# Patient Record
Sex: FEMALE | Race: WHITE | NOT HISPANIC OR LATINO | URBAN - METROPOLITAN AREA
[De-identification: names, ages, dates, MRNs, and addresses within clinical notes are randomized per-mention and may not be internally consistent; named-entity substitution may affect disease eponyms.]

---

## 2018-03-01 ENCOUNTER — APPOINTMENT (RX ONLY)
Dept: URBAN - METROPOLITAN AREA CLINIC 170 | Facility: CLINIC | Age: 73
Setting detail: DERMATOLOGY
End: 2018-03-01

## 2018-03-01 DIAGNOSIS — Z41.9 ENCOUNTER FOR PROCEDURE FOR PURPOSES OTHER THAN REMEDYING HEALTH STATE, UNSPECIFIED: ICD-10-CM

## 2018-03-01 PROCEDURE — ? PRESCRIPTION

## 2018-03-01 PROCEDURE — ? DYSPORT

## 2018-03-01 PROCEDURE — ? COSMETIC CONSULTATION: FILLERS

## 2018-03-01 RX ORDER — LIDOCAINE, PRILOCAINE 25; 25 MG/G; MG/G
CREAM TOPICAL
Qty: 1 | Refills: 3 | Status: ERX | COMMUNITY
Start: 2018-03-01

## 2018-03-01 RX ADMIN — LIDOCAINE, PRILOCAINE: 25; 25 CREAM TOPICAL at 00:00

## 2018-03-01 ASSESSMENT — LOCATION SIMPLE DESCRIPTION DERM: LOCATION SIMPLE: RIGHT CHEEK

## 2018-03-01 ASSESSMENT — LOCATION DETAILED DESCRIPTION DERM
LOCATION DETAILED: RIGHT CENTRAL MALAR CHEEK
LOCATION DETAILED: RIGHT INFERIOR CENTRAL MALAR CHEEK

## 2018-03-01 ASSESSMENT — LOCATION ZONE DERM: LOCATION ZONE: FACE

## 2018-03-01 NOTE — PROCEDURE: DYSPORT
Masseter Units: 0
Additional Area 1 Units: 98
Detail Level: Zone
Price (Use Numbers Only, No Special Characters Or $): 545
Consent: Written consent obtained. Risks include but not limited to lid/brow ptosis, bruising, swelling, diplopia, temporary effect, incomplete chemical denervation.
Post-Care Instructions: Patient instructed to not lie down for 4 hours and limit physical activity for 24 hours.
Additional Area 1 Location: Face
Expiration Date (Month Year): 4/30/18
Lot #: [id number]
Dilution (U/ 0.1cc): 10

## 2018-03-07 ENCOUNTER — APPOINTMENT (RX ONLY)
Dept: URBAN - METROPOLITAN AREA CLINIC 170 | Facility: CLINIC | Age: 73
Setting detail: DERMATOLOGY
End: 2018-03-07

## 2018-03-07 DIAGNOSIS — Z41.9 ENCOUNTER FOR PROCEDURE FOR PURPOSES OTHER THAN REMEDYING HEALTH STATE, UNSPECIFIED: ICD-10-CM

## 2018-03-07 PROCEDURE — ? FILLERS

## 2018-03-07 ASSESSMENT — LOCATION DETAILED DESCRIPTION DERM: LOCATION DETAILED: LEFT INFERIOR MEDIAL MALAR CHEEK

## 2018-03-07 ASSESSMENT — LOCATION ZONE DERM: LOCATION ZONE: FACE

## 2018-03-07 ASSESSMENT — LOCATION SIMPLE DESCRIPTION DERM: LOCATION SIMPLE: LEFT CHEEK

## 2018-03-21 ENCOUNTER — APPOINTMENT (RX ONLY)
Dept: URBAN - METROPOLITAN AREA CLINIC 170 | Facility: CLINIC | Age: 73
Setting detail: DERMATOLOGY
End: 2018-03-21

## 2018-03-21 DIAGNOSIS — Z41.9 ENCOUNTER FOR PROCEDURE FOR PURPOSES OTHER THAN REMEDYING HEALTH STATE, UNSPECIFIED: ICD-10-CM

## 2018-03-21 PROCEDURE — ? TREATMENT REGIMEN

## 2018-03-21 PROCEDURE — ? PATIENT SPECIFIC COUNSELING

## 2018-03-21 NOTE — PROCEDURE: PATIENT SPECIFIC COUNSELING
Patient is happy with results. Will add volbella to upper cutaneous lip -etchy lines. (1) Voluma- lateral cheeks
Detail Level: Zone

## 2018-04-18 ENCOUNTER — APPOINTMENT (RX ONLY)
Dept: URBAN - METROPOLITAN AREA CLINIC 170 | Facility: CLINIC | Age: 73
Setting detail: DERMATOLOGY
End: 2018-04-18

## 2018-04-18 DIAGNOSIS — Z41.9 ENCOUNTER FOR PROCEDURE FOR PURPOSES OTHER THAN REMEDYING HEALTH STATE, UNSPECIFIED: ICD-10-CM

## 2018-04-18 PROCEDURE — ? FILLERS

## 2018-04-18 ASSESSMENT — LOCATION ZONE DERM: LOCATION ZONE: FACE

## 2018-04-18 ASSESSMENT — LOCATION DETAILED DESCRIPTION DERM: LOCATION DETAILED: RIGHT INFERIOR CENTRAL MALAR CHEEK

## 2018-04-18 ASSESSMENT — LOCATION SIMPLE DESCRIPTION DERM: LOCATION SIMPLE: RIGHT CHEEK

## 2018-04-18 NOTE — PROCEDURE: FILLERS
Dorsal Hands Filler  Volume In Cc: 0
Consent: Written consent obtained. Risks include but not limited to bruising, beading, irregular texture, ulceration, infection, allergic reaction, scar formation, incomplete augmentation, temporary nature, procedural pain.
Filler: Juvederm Volbella XC
Additional Area 1 Volume In Cc: 1
Filler: Voluma
Additional Area 1 Volume In Cc: 0.5
Lot #: [id number]
Additional Area 1 Location: Face
Include Cannula Information In Note?: No
Expiration Date (Month Year): 5/15/16
Lot #: [id number]
Expiration Date (Month Year): 06/10/19
Map Statment: See Attach Map for Complete Details
Detail Level: Zone
Lot #: [id number]
Use Map Statement For Sites (Optional): Yes
Post-Care Instructions: Patient instructed to apply ice to reduce swelling.
Topical Anesthesia?: 2.5% lidocaine, 2.5% prilocaine
Price (Use Numbers Only, No Special Characters Or $): 7669
Anesthesia Volume In Cc: 0.2
Expiration Date (Month Year): 8/12/19

## 2021-02-22 RX ORDER — LIDOCAINE AND PRILOCAINE 25; 25 MG/G; MG/G
CREAM TOPICAL
Qty: 1 | Refills: 3 | Status: ERX

## 2021-03-30 ENCOUNTER — APPOINTMENT (RX ONLY)
Dept: URBAN - METROPOLITAN AREA CLINIC 173 | Facility: CLINIC | Age: 76
Setting detail: DERMATOLOGY
End: 2021-03-30

## 2021-03-30 DIAGNOSIS — Z41.9 ENCOUNTER FOR PROCEDURE FOR PURPOSES OTHER THAN REMEDYING HEALTH STATE, UNSPECIFIED: ICD-10-CM

## 2021-03-30 PROCEDURE — ? BOTOX

## 2021-03-30 PROCEDURE — ? FILLERS

## 2021-03-30 PROCEDURE — ? TREATMENT REGIMEN

## 2021-03-30 NOTE — PROCEDURE: BOTOX
Expiration Date (Month Year): 11/23
Show Masseter Units: Yes
Inferior Lateral Orbicularis Oculi Units: 18
Right Pupillary Line Units: 0
Show Right And Left Periorbital Units: No
Glabellar Complex Units: 21
Price (Use Numbers Only, No Special Characters Or $): 314
Forehead Units: 2
Consent: Written consent obtained. Risks include but not limited to lid/brow ptosis, bruising, swelling, diplopia, temporary effect, incomplete chemical denervation.
Lot #: [id number]
Detail Level: Detailed
Post-Care Instructions: Patient instructed to not lie down for 4 hours and limit physical activity for 24 hours. Patient instructed not to travel by airplane for 48 hours.
Dilution (U/0.1 Cc): 5

## 2021-03-30 NOTE — PROCEDURE: FILLERS
Include Cannula Information In Note?: No
Marionette Lines Filler  Volume In Cc: 0
Lot #: [id number]
Expiration Date (Month Year): 7/3/23
Detail Level: Detailed
Price (Use Numbers Only, No Special Characters Or $): 7791
Expiration Date (Month Year): 01/31/22
Filler: Juvederm Voluma XC
Include Cannula Size?: 27G
Map Statment: See Attach Map for Complete Details
Cheeks Filler Volume In Cc: 2
Include Cannula Length?: 1.5 inch
Additional Area 1 Location: Ear Lobes
Lot #: [id number]
Filler: RHA 2
Anesthesia Type: 1% lidocaine with epinephrine 1:100,000 buffered with 8.4% sodium bicarbonate (1:9 ratio)
Expiration Date (Month Year): 03/12/22
Anesthesia Volume In Cc: 0.5
Post-Care Instructions: Patient instructed to apply ice to reduce swelling. Patient was given Cetirizine 10mg tablet in office for swelling.
Consent: Written consent obtained. Risks include but not limited to bruising, beading, irregular texture, ulceration, infection, allergic reaction, scar formation, incomplete augmentation, temporary nature, procedural pain.
Vermilion Lips Filler Volume In Cc: 1
Additional Anesthesia Volume In Cc: 6
Lot #: [id number]

## 2023-03-07 ENCOUNTER — APPOINTMENT (RX ONLY)
Dept: URBAN - METROPOLITAN AREA CLINIC 173 | Facility: CLINIC | Age: 78
Setting detail: DERMATOLOGY
End: 2023-03-07

## 2023-03-07 DIAGNOSIS — Z41.9 ENCOUNTER FOR PROCEDURE FOR PURPOSES OTHER THAN REMEDYING HEALTH STATE, UNSPECIFIED: ICD-10-CM

## 2023-03-07 PROCEDURE — ? BOTOX

## 2023-03-07 PROCEDURE — ? FILLERS

## 2023-03-07 NOTE — PROCEDURE: BOTOX
Expiration Date (Month Year): 05/25
Show Masseter Units: Yes
Inferior Lateral Orbicularis Oculi Units: 18
Right Pupillary Line Units: 0
Show Right And Left Periorbital Units: No
Glabellar Complex Units: 21
Price (Use Numbers Only, No Special Characters Or $): 105
Forehead Units: 2
Consent: Written consent obtained. Risks include but not limited to lid/brow ptosis, bruising, swelling, diplopia, temporary effect, incomplete chemical denervation.
Lot #: [id number]
Detail Level: Detailed
Post-Care Instructions: Patient instructed to not lie down for 4 hours and limit physical activity for 24 hours. Patient instructed not to travel by airplane for 48 hours.
Dilution (U/0.1 Cc): 5

## 2023-03-07 NOTE — PROCEDURE: FILLERS
Include Cannula Information In Note?: Yes
Marionette Lines Filler  Volume In Cc: 0
Lot #: [id number]
Expiration Date (Month Year): 9/23/2025
Include Cannula Information In Note?: No
Detail Level: Detailed
Price (Use Numbers Only, No Special Characters Or $): 7232
Expiration Date (Month Year): 01/31/22
Filler: Juvederm Voluma XC
Include Cannula Size?: 27G
Map Statment: See Attach Map for Complete Details
Cheeks Filler Volume In Cc: 2
Include Cannula Length?: 1.5 inch
Additional Area 1 Location: Ear Lobes
Lot #: [id number]
Filler: RHA Redensity
Anesthesia Type: 2% lidocaine with epinephrine and a 1:10 solution of 8.4% sodium bicarbonate
Expiration Date (Month Year): 12/12/2023
Anesthesia Volume In Cc: 0.5
Post-Care Instructions: Patient instructed to apply ice to reduce swelling. Patient was given Cetirizine 10mg tablet in office for swelling.
Consent: Written consent obtained. Risks include but not limited to bruising, beading, irregular texture, ulceration, infection, allergic reaction, scar formation, incomplete augmentation, temporary nature, procedural pain.
Vermilion Lips Filler Volume In Cc: 1
Additional Anesthesia Volume In Cc: 6
Lot #: [id number]
Topical Anesthesia?: 23% lidocaine, 7% tetracaine
Aspiration Statement: Aspiration was performed prior to injecting site with filler.

## 2024-02-19 ENCOUNTER — APPOINTMENT (RX ONLY)
Dept: URBAN - METROPOLITAN AREA CLINIC 173 | Facility: CLINIC | Age: 79
Setting detail: DERMATOLOGY
End: 2024-02-19

## 2024-02-19 DIAGNOSIS — Z41.9 ENCOUNTER FOR PROCEDURE FOR PURPOSES OTHER THAN REMEDYING HEALTH STATE, UNSPECIFIED: ICD-10-CM

## 2024-02-19 PROCEDURE — ? BOTOX

## 2024-02-19 PROCEDURE — ? FILLERS

## 2024-02-19 NOTE — PROCEDURE: BOTOX
Expiration Date (Month Year): 04/2026
Show Masseter Units: Yes
Right Pupillary Line Units: 0
Show Right And Left Periorbital Units: No
Additional Area 1 Units: 5
Price (Use Numbers Only, No Special Characters Or $): 80
Consent: Written consent obtained. Risks include but not limited to lid/brow ptosis, bruising, swelling, diplopia, temporary effect, incomplete chemical denervation.
Lot #: [id number]
Detail Level: Detailed
Post-Care Instructions: Patient instructed to not lie down for 4 hours and limit physical activity for 24 hours. Patient instructed not to travel by airplane for 48 hours.
Additional Area 1 Location: Chin

## 2024-03-13 ENCOUNTER — APPOINTMENT (RX ONLY)
Dept: URBAN - METROPOLITAN AREA CLINIC 173 | Facility: CLINIC | Age: 79
Setting detail: DERMATOLOGY
End: 2024-03-13

## 2024-03-13 DIAGNOSIS — Z41.9 ENCOUNTER FOR PROCEDURE FOR PURPOSES OTHER THAN REMEDYING HEALTH STATE, UNSPECIFIED: ICD-10-CM

## 2024-03-13 PROCEDURE — ? BOTOX

## 2024-03-13 PROCEDURE — ? COSMETIC FOLLOW-UP

## 2024-03-13 NOTE — PROCEDURE: BOTOX
Expiration Date (Month Year): 05/2026
Show Masseter Units: Yes
Right Pupillary Line Units: 0
Show Right And Left Periorbital Units: No
Glabellar Complex Units: 21
Price (Use Numbers Only, No Special Characters Or $): 739
Forehead Units: 2
Consent: Written consent obtained. Risks include but not limited to lid/brow ptosis, bruising, swelling, diplopia, temporary effect, incomplete chemical denervation.
Lot #: [id number]
Detail Level: Detailed
Post-Care Instructions: Patient instructed to not lie down for 4 hours and limit physical activity for 24 hours. Patient instructed not to travel by airplane for 48 hours.
Dilution (U/0.1 Cc): 5
Periorbital Skin Units: 18

## 2024-03-13 NOTE — PROCEDURE: COSMETIC FOLLOW-UP
Comments (Free Text): FIFU: \\n\\nPatient happy with filler treatment but wants more in the lip lines. Will plan to do a 1/2 syringe of RHA Redensity in the lip lines at next filler visit.
Detail Level: Zone
Price (Use Numbers Only, No Special Characters Or $): 0

## 2024-04-08 ENCOUNTER — APPOINTMENT (RX ONLY)
Dept: URBAN - METROPOLITAN AREA CLINIC 173 | Facility: CLINIC | Age: 79
Setting detail: DERMATOLOGY
End: 2024-04-08

## 2024-04-08 DIAGNOSIS — Z41.9 ENCOUNTER FOR PROCEDURE FOR PURPOSES OTHER THAN REMEDYING HEALTH STATE, UNSPECIFIED: ICD-10-CM

## 2024-04-08 PROCEDURE — ? FILLERS

## 2024-04-08 NOTE — PROCEDURE: FILLERS
Jawline Filler Volume In Cc: 0
Use Map Statement For Sites (Optional): No
Lot #: [id number]
Filler: RHA Redensity
Additional Area 1 Location: lip lines
Map Statment: See Attach Map for Complete Details
Additional Area 1 Volume In Cc: 0.5
Expiration Date (Month Year): 01/31/22
Consent: Written consent obtained. Risks include but not limited to bruising, beading, irregular texture, ulceration, infection, allergic reaction, scar formation, incomplete augmentation, temporary nature, procedural pain.
Post-Care Instructions: Patient instructed to apply Alastin post injection serum
Include Cannula Size?: 27G
Aspiration Statement: Aspiration was performed prior to injecting site with filler.
Include Cannula Length?: 1.5 inch
Additional Area 1 Location: Ear Lobes
Lot #: [id number] *JLS Special*
Expiration Date (Month Year): 11/15/2025
Lot #: [id number]
Topical Anesthesia?: 30% lidocaine, plasticized base
Detail Level: Detailed
Expiration Date (Month Year): 11/30/2022
Price (Use Numbers Only, No Special Characters Or $): 941

## 2025-03-18 ENCOUNTER — APPOINTMENT (OUTPATIENT)
Dept: URBAN - METROPOLITAN AREA CLINIC 173 | Facility: CLINIC | Age: 80
Setting detail: DERMATOLOGY
End: 2025-03-18

## 2025-03-18 DIAGNOSIS — L73.8 OTHER SPECIFIED FOLLICULAR DISORDERS: ICD-10-CM

## 2025-03-18 DIAGNOSIS — Z41.9 ENCOUNTER FOR PROCEDURE FOR PURPOSES OTHER THAN REMEDYING HEALTH STATE, UNSPECIFIED: ICD-10-CM

## 2025-03-18 PROCEDURE — ? PATIENT SPECIFIC COUNSELING

## 2025-03-18 PROCEDURE — ? TREATMENT REGIMEN

## 2025-03-18 PROCEDURE — ? FILLERS

## 2025-03-18 PROCEDURE — ? BOTOX

## 2025-03-18 NOTE — PROCEDURE: FILLERS
Expiration Date (Month Year): 04/26/2026
Marionette Lines Filler Volume In Cc: 0
Detail Level: Detailed
Topical Anesthesia?: 30% lidocaine, plasticized base
Additional Area 1 Location: marionette lines, mouth corners, lip lines, chin, & glabellar lines
Include Cannula Information In Note?: Yes
Include Cannula Size?: 25G
Additional Area 1 Volume In Cc: 1
Include Cannula Information In Note?: No
Price (Use Numbers Only, No Special Characters Or $): 5011
Include Cannula Length?: 1.5 inch
Map Statment: See Attach Map for Complete Details
Filler: RHA Redensity
Additional Area 1 Location: Ear Lobes
Aspiration Statement: Aspiration was performed prior to injecting site with filler.
Filler: Juvederm Voluma XC
Consent: Written consent obtained. Risks include but not limited to bruising, beading, irregular texture, ulceration, infection, allergic reaction, scar formation, incomplete augmentation, temporary nature, procedural pain.
Post-Care Instructions: Patient instructed to apply Alastin post injection serum
Lot #: [id number]
Cheeks Filler Volume In Cc: 2
Anesthesia Type: 1% lidocaine with 1:100,000 epinephrine and a 1:10 solution of 8.4% sodium bicarbonate
Lot #: [id number]
Expiration Date (Month Year): 01/31/22
Lot #: [id number]
Anesthesia Volume In Cc: 0.1
Expiration Date (Month Year): 02/27/2027

## 2025-03-18 NOTE — PROCEDURE: BOTOX
[facility] Units: 0
Show Lateral Platysmal Band Units: Yes
Consent: Written consent obtained. Risks include but not limited to lid/brow ptosis, bruising, swelling, diplopia, temporary effect, incomplete chemical denervation.
Post-Care Instructions: Patient instructed to not lie down for 4 hours and limit physical activity for 24 hours. Patient instructed not to travel by airplane for 48 hours.
Show Ucl Units: No
Periorbital Skin Units: 18
Detail Level: Detailed
Lot #: [id number]
Dilution (U/0.1 Cc): 5
Incrementing Botox Units: By 0.5 Units
Forehead Units: 2
Glabellar Complex Units: 21
Expiration Date (Month Year): 05/2027
Price (Use Numbers Only, No Special Characters Or $): 322

## 2025-03-18 NOTE — PROCEDURE: PATIENT SPECIFIC COUNSELING
Detail Level: Zone
Discussed treatment of sebaceous hyperplasia with electrodesiccation with epilating tip. Patient does not want to treat today and will schedule a separate appointment. \\n\\nPricing-\\n1-3 lesions $150\\n2-15 lesions $250\\n15 or more $400

## 2025-07-21 NOTE — PROCEDURE: FILLERS
Jawline Filler Volume In Cc: 0
Price (Use Numbers Only, No Special Characters Or $): 999
Post-Care Instructions: Patient instructed to apply ice to reduce swelling.
Lot #: [id number]
Lot #: [id number]
Additional Area 1 Location: Face
Consent: Written consent obtained. Risks include but not limited to bruising, beading, irregular texture, ulceration, infection, allergic reaction, scar formation, incomplete augmentation, temporary nature, procedural pain.
Additional Area 1 Volume In Cc: 0.5
Anesthesia Type: 1% lidocaine with epinephrine and a 1:10 solution of 8.4% sodium bicarbonate
Filler: Juvederm Volbella XC
Detail Level: Zone
Expiration Date (Month Year): 11/30/18
Topical Anesthesia?: 2.5% lidocaine, 2.5% prilocaine
Lot #: [id number]
Expiration Date (Month Year): 9/19/19
Map Statment: See Attach Map for Complete Details
Anesthesia Volume In Cc: 0.2
Include Cannula Information In Note?: No
Use Map Statement For Sites (Optional): Yes
Expiration Date (Month Year): 5/15/16
Imaging Studies